# Patient Record
Sex: MALE | Race: OTHER | HISPANIC OR LATINO
[De-identification: names, ages, dates, MRNs, and addresses within clinical notes are randomized per-mention and may not be internally consistent; named-entity substitution may affect disease eponyms.]

---

## 2020-04-20 PROBLEM — Z00.00 ENCOUNTER FOR PREVENTIVE HEALTH EXAMINATION: Status: ACTIVE | Noted: 2020-04-20

## 2022-05-09 ENCOUNTER — APPOINTMENT (OUTPATIENT)
Dept: GASTROENTEROLOGY | Facility: CLINIC | Age: 57
End: 2022-05-09
Payer: COMMERCIAL

## 2022-05-09 VITALS
DIASTOLIC BLOOD PRESSURE: 70 MMHG | SYSTOLIC BLOOD PRESSURE: 138 MMHG | BODY MASS INDEX: 26.66 KG/M2 | WEIGHT: 160 LBS | TEMPERATURE: 97 F | HEIGHT: 65 IN | HEART RATE: 97 BPM

## 2022-05-09 DIAGNOSIS — Z78.9 OTHER SPECIFIED HEALTH STATUS: ICD-10-CM

## 2022-05-09 DIAGNOSIS — Z87.891 PERSONAL HISTORY OF NICOTINE DEPENDENCE: ICD-10-CM

## 2022-05-09 PROCEDURE — 99214 OFFICE O/P EST MOD 30 MIN: CPT

## 2022-05-09 RX ORDER — TAMSULOSIN HYDROCHLORIDE 0.4 MG/1
0.4 CAPSULE ORAL
Refills: 0 | Status: ACTIVE | COMMUNITY

## 2022-05-09 RX ORDER — ATORVASTATIN CALCIUM 20 MG/1
20 TABLET, FILM COATED ORAL
Refills: 0 | Status: ACTIVE | COMMUNITY

## 2022-05-09 NOTE — ASSESSMENT
[FreeTextEntry1] : In a patient whose last colonoscopy was in the year 2015 with Dr. Galan, who is now retiring, there is some change in bowel habits, some constipation, some right and left sided pain in the abdomen which is relieved with a good bowel evacuation, and a sense of an intermittently prolapsing lesion of the rectum which seems to be by history and by Dr. Galan's exam and a prolapsing hemorrhoid\par \par 2.  Long-term use of pantoprazole over 5 years, with his reflux symptoms mostly consisting of a knot-like sensation in the throat.\par \par We have talked about colonoscopy and EGD\par He will decrease his pantoprazole to 40 mg every other day for 2 weeks and then every third day for 2 weeks and then try to stop it\par \par I talked about reflux Gourmet and he will try that as well\par F \par More than 50% of the face to face time was devoted to counseling and /or coordination of care.  THis coordination of care may have included reviewing other medical notes and reports, and communicating with other health professionals\par \par AS WE OBTAIN INFORMED CONSENT FOR COLONOSCOPY, UPPER ENDOSCOPY [EGD], OR BOTH PROCEDURES TOGETHER;\par \par As with all procedures, there are risks of which the patient should be aware\par \par 1.  Anesthesia; deep sedation with Propofol;  there is a small risk of aspiration and pulmonary infection.  The Anesthesiologist meets with the patient the morning of the procedure to discuss in more detail\par \par 2.  risk of bleeding; from removal of a polyp, or rarely, from biopsies, 1 % or less\par \par 3.  risk of injury or perforation of the colon or upper GI tract; one in a thousand or less,  from removing a polyp or from advancing the instrument\par \par \par

## 2022-05-09 NOTE — PHYSICAL EXAM
[Bowel Sounds] : normal bowel sounds [Abdomen Soft] : soft [Abdomen Tenderness] : non-tender [] : no hepato-splenomegaly [Abdomen Mass (___ Cm)] : no abdominal mass palpated [No Rectal Mass] : no rectal mass [Internal Hemorrhoid] : no internal hemorrhoids [External Hemorrhoid] : no external hemorrhoids [FreeTextEntry1] : Did not know a prolapsing hemorrhoid even with pressure but the patient does say that it comes out when he moves his bowels

## 2022-05-09 NOTE — HISTORY OF PRESENT ILLNESS
[FreeTextEntry1] : In office visit first visit\par Chief complaint\par 1.  Change in bowel habits, some constipation, some right and left sided abdominal pain, and concern about a protuberance per rectum on an intermittent basis which seems to come out with his bowel movements.  It was suggested that this was a prolapsing hemorrhoid\par \par \par 2.  Symptoms suggesting esophageal reflux, with sensitivity to acidic foods and esophageal triggers such as tomato sauce, has been on pantoprazole for the last 5 or more years, although sometimes he is off or takes him self off the pantoprazole abruptly which may result in increased symptoms several weeks later\par 3.  He is otherwise in good medical condition.  He does not have any heart disease.  He does have hypertension and he is treated by Dr. Osbaldo Mixon, and she has been aiming to take him off the chlorthalidone and potassium chloride combination and replacing that with amlodipine so she is shifting him over.

## 2022-06-24 ENCOUNTER — RESULT REVIEW (OUTPATIENT)
Age: 57
End: 2022-06-24

## 2022-06-26 ENCOUNTER — RESULT REVIEW (OUTPATIENT)
Age: 57
End: 2022-06-26

## 2022-06-27 ENCOUNTER — TRANSCRIPTION ENCOUNTER (OUTPATIENT)
Age: 57
End: 2022-06-27

## 2022-06-27 ENCOUNTER — APPOINTMENT (OUTPATIENT)
Dept: GASTROENTEROLOGY | Facility: HOSPITAL | Age: 57
End: 2022-06-27

## 2022-06-29 ENCOUNTER — NON-APPOINTMENT (OUTPATIENT)
Age: 57
End: 2022-06-29

## 2022-09-20 ENCOUNTER — APPOINTMENT (OUTPATIENT)
Dept: GASTROENTEROLOGY | Facility: CLINIC | Age: 57
End: 2022-09-20

## 2022-09-20 VITALS
BODY MASS INDEX: 26.16 KG/M2 | HEART RATE: 65 BPM | SYSTOLIC BLOOD PRESSURE: 146 MMHG | DIASTOLIC BLOOD PRESSURE: 82 MMHG | TEMPERATURE: 97.2 F | WEIGHT: 157 LBS | HEIGHT: 65 IN

## 2022-09-20 PROCEDURE — 99213 OFFICE O/P EST LOW 20 MIN: CPT

## 2022-09-20 RX ORDER — AMLODIPINE BESYLATE 5 MG/1
5 TABLET ORAL
Refills: 0 | Status: ACTIVE | COMMUNITY

## 2022-09-20 NOTE — ASSESSMENT
[FreeTextEntry1] : 1.  I have explained that the patient had been taking pantoprazole, which has side effects when you are on it a long time, the worst being thinning of the bone, or bone fragility, due to osteoporosis\par 2.  the other meds in the pantoprazole class have the same potential side effect\par 3.  therefore, i am advising the patient to stay on the reflux gourmet, which is free of this type of side effects\par 4.  patient could also try mixing it up with gaviscon advance which tastes diferrent, is also purchased on line\par 5. as long as patient does not develop significant symptoms, i would prefer this method of treatment vs putting him back on a pantoprazole like drug\par 6.  he also could alternate reflux gourmet with gaviscon advance\par \par More than 50% of the face to face time was devoted to counseling and /or coordination of care.  THis coordination of care may have included reviewing other medical notes and reports, and communicating with other health professionals\par \par patient is asked to return in six months

## 2022-09-20 NOTE — HISTORY OF PRESENT ILLNESS
[FreeTextEntry1] : patient has returned to office for a fu visit\par he was scoped in June\par egd for reflux,\par had been on Pantoprazole\par but he didn’t stay on pantoprazole, and when he stopped the pantoprazole, sx would return in a few days or in a week\par \par he had a colonoscopy;  which was normal\par he had an egd;  which showed short segment of barretts, confirmed on biopsies\par \par he has switched from pantoprazole intermittently to reflux gourmet, which he takes with each meal, just one teaspoon before each meal\par and he has nearly 100 per cent response, and prevention of any heartburn\par \par he used to have a lot of heartburn at night

## 2023-03-07 ENCOUNTER — APPOINTMENT (OUTPATIENT)
Dept: GASTROENTEROLOGY | Facility: CLINIC | Age: 58
End: 2023-03-07
Payer: COMMERCIAL

## 2023-03-07 VITALS
DIASTOLIC BLOOD PRESSURE: 78 MMHG | OXYGEN SATURATION: 98 % | SYSTOLIC BLOOD PRESSURE: 141 MMHG | WEIGHT: 157 LBS | BODY MASS INDEX: 26.16 KG/M2 | HEIGHT: 65 IN | HEART RATE: 62 BPM

## 2023-03-07 DIAGNOSIS — R07.89 OTHER CHEST PAIN: ICD-10-CM

## 2023-03-07 DIAGNOSIS — R10.11 RIGHT UPPER QUADRANT PAIN: ICD-10-CM

## 2023-03-07 PROCEDURE — 99214 OFFICE O/P EST MOD 30 MIN: CPT

## 2023-03-07 NOTE — HISTORY OF PRESENT ILLNESS
[FreeTextEntry1] : patient has recently been to er at [facility], jan 24th\par with precordial pain \par lasted several days\par pressure like\par started two or three hours post prandially, while he was driving\par \par not clearly related to meals or esophageal irritants\par non exertional \par sometimes there is ruq pain, which is more frequent than the chest pain\par \par no sob, no nausea or vomiting\par

## 2023-03-07 NOTE — PHYSICAL EXAM
[Normal] : alert, normal voice/communication, healthy appearing, no acute distress [Abdomen Soft] : soft [RUQ] : RUQ [de-identified] : ruq tenderness

## 2023-03-07 NOTE — ASSESSMENT
[FreeTextEntry1] : 1.  patient with known Barretts esophagus, on egd in 2022\par short segment\par \par has been experiencing recurring ruq pain but also atypical chest pain, non exertional, sometimes like a pressure, sometime para sternal, not radiating down the arms, not clearly with sob, or diaphoresis

## 2023-03-14 ENCOUNTER — RESULT REVIEW (OUTPATIENT)
Age: 58
End: 2023-03-14

## 2023-03-19 ENCOUNTER — NON-APPOINTMENT (OUTPATIENT)
Age: 58
End: 2023-03-19

## 2023-05-18 ENCOUNTER — APPOINTMENT (OUTPATIENT)
Dept: GASTROENTEROLOGY | Facility: CLINIC | Age: 58
End: 2023-05-18
Payer: COMMERCIAL

## 2023-05-18 PROCEDURE — 99443: CPT

## 2023-05-18 NOTE — REASON FOR VISIT
[Follow-up] : a follow-up of an existing diagnosis [FreeTextEntry1] : follow up for pre existing condition

## 2023-05-18 NOTE — HISTORY OF PRESENT ILLNESS
[FreeTextEntry1] : this is  a telephon visit, audio only, patient is at home, I am in my house also, just patient and I, consent on file\par \par 1.  esophageal reflux, short segment joe's esophagus, severe reflux symptoms\par he has been on Omeprazole, 20 mgm per day, and the clinical control has been quite good, he feels a lot better, but still frequently does get some heartburn should he take a spicy food or ingest an esophageal irritant\par he does his best on diet, has given up coffee, for example\par he is thin, not heaby, so weight loss is not an option\par \par however, for David, this has really been good improvement, perhaps better than he used to experience with Pantoprazole

## 2023-05-18 NOTE — ASSESSMENT
[FreeTextEntry1] : 1.  doing well on omeprazole 20 mgm per day\par 2.  The omeprazole control seems to be superior to previous control when he was on pantoprazole.  This is somewhat surprising but occasionally 1 formulation works better than another for certain patient\par 3.  The goal with Linares's should be very good acid and symptom suppression.  Therefore, I tend to avoid every other day regimens, and if there is still considerable reflux symptomatology even with clinical improvement I favor upping the dose from omeprazole 20 to omeprazole 40 mg/day.\par 4.  Also I am recommending alginate therapy in the form of reflux Gourmet\par \par Plan: EGD in June 2025, follow-up clinically in 1 year or earlier should there be problems or failure of control, and I am sending the patient in full and a link for reflux Gourmet, and finally I asked him to try a wedge pillow, or a strategy of taking alginate routinely at bedtime to prevent any nocturnal symptoms when he eats too late

## 2024-03-12 ENCOUNTER — APPOINTMENT (OUTPATIENT)
Dept: GASTROENTEROLOGY | Facility: CLINIC | Age: 59
End: 2024-03-12
Payer: COMMERCIAL

## 2024-03-12 VITALS
OXYGEN SATURATION: 98 % | SYSTOLIC BLOOD PRESSURE: 140 MMHG | DIASTOLIC BLOOD PRESSURE: 90 MMHG | BODY MASS INDEX: 26.66 KG/M2 | WEIGHT: 160 LBS | HEART RATE: 93 BPM | HEIGHT: 65 IN | RESPIRATION RATE: 15 BRPM

## 2024-03-12 DIAGNOSIS — K22.70 BARRETT'S ESOPHAGUS W/OUT DYSPLASIA: ICD-10-CM

## 2024-03-12 DIAGNOSIS — K21.9 GASTRO-ESOPHAGEAL REFLUX DISEASE W/OUT ESOPHAGITIS: ICD-10-CM

## 2024-03-12 PROCEDURE — 99213 OFFICE O/P EST LOW 20 MIN: CPT

## 2024-03-12 RX ORDER — PANTOPRAZOLE 40 MG/1
40 TABLET, DELAYED RELEASE ORAL
Refills: 0 | Status: DISCONTINUED | COMMUNITY
End: 2024-03-12

## 2024-03-12 RX ORDER — CHLORTHALIDONE 25 MG/1
25 TABLET ORAL
Refills: 0 | Status: DISCONTINUED | COMMUNITY
End: 2024-03-12

## 2024-03-12 RX ORDER — FAMOTIDINE 40 MG/1
40 TABLET, FILM COATED ORAL
Refills: 0 | Status: ACTIVE | COMMUNITY

## 2024-03-12 RX ORDER — POTASSIUM CHLORIDE 750 MG/1
10 TABLET, EXTENDED RELEASE ORAL
Refills: 0 | Status: DISCONTINUED | COMMUNITY
End: 2024-03-12

## 2024-03-12 NOTE — HISTORY OF PRESENT ILLNESS
[FreeTextEntry1] : 1.  re patient s sx of gerd he was actually doing well and then perhaps over the last few weeks, while his retrosternal burning did not start, he began noticing a throat irritation and the need to clear his throat, so he put himself on Pepcid 20 mg twice a day in addition to his omeprazole.  With that combination the throat clearing seem to resolve pretty quickly He has been on a steady date dose of omeprazole 20 mg/day more or less continuously since the time of his EGD as it is proved more effective than the pantoprazole which she had previously taken. He has a short segment Linares's and he is aware of it so we would like to use as our goal the suppression of any symptoms of heartburn and reflux.

## 2024-03-12 NOTE — ASSESSMENT
[FreeTextEntry1] : 1.  patient is in good medical health, and his reflux is doing quite well. 2.  he did have a bit of a flareup of reflux symptoms, and these were mostly characterized by throat clearing rather than the more typical substernal burning which she had had previously. 3.  He put himself on famotidine or Pepcid 20 mg before breakfast and before dinner and he noticed that that helped pretty quickly 4. patient will go to just using famotidine at night time 5.  he has been on a long course of ppi therapy, and i am going to advise a.  he take a magnesium calcium tablet, as the ppis are occasionally assoc with low magnesium b.  he can take some vitamin d, like 1000 u of vitamin d3 per day c.  and this is important  when he next sees dr Adler, I am asking the patient to discuss having a bone density test as a baseline and perhaps a serum magnesium test with the next blood draw  cc:  Dr Adler, we will fax this report to Dr Adler.  fu office in one year, perhaps in the spring of 2025, and then egd approx june 2025

## 2024-04-21 ENCOUNTER — RX RENEWAL (OUTPATIENT)
Age: 59
End: 2024-04-21

## 2024-04-21 RX ORDER — OMEPRAZOLE 40 MG/1
40 CAPSULE, DELAYED RELEASE ORAL
Qty: 90 | Refills: 3 | Status: ACTIVE | COMMUNITY
Start: 2023-05-18 | End: 1900-01-01

## 2024-10-08 ENCOUNTER — APPOINTMENT (OUTPATIENT)
Dept: GASTROENTEROLOGY | Facility: CLINIC | Age: 59
End: 2024-10-08

## 2024-11-26 ENCOUNTER — APPOINTMENT (OUTPATIENT)
Dept: GASTROENTEROLOGY | Facility: CLINIC | Age: 59
End: 2024-11-26
Payer: COMMERCIAL

## 2024-11-26 VITALS
WEIGHT: 160 LBS | BODY MASS INDEX: 26.66 KG/M2 | DIASTOLIC BLOOD PRESSURE: 78 MMHG | HEART RATE: 74 BPM | OXYGEN SATURATION: 99 % | SYSTOLIC BLOOD PRESSURE: 136 MMHG | HEIGHT: 65 IN

## 2024-11-26 DIAGNOSIS — K21.9 GASTRO-ESOPHAGEAL REFLUX DISEASE W/OUT ESOPHAGITIS: ICD-10-CM

## 2024-11-26 DIAGNOSIS — K22.70 BARRETT'S ESOPHAGUS W/OUT DYSPLASIA: ICD-10-CM

## 2024-11-26 PROCEDURE — 99213 OFFICE O/P EST LOW 20 MIN: CPT

## 2025-01-24 ENCOUNTER — RESULT REVIEW (OUTPATIENT)
Age: 60
End: 2025-01-24

## 2025-01-24 ENCOUNTER — APPOINTMENT (OUTPATIENT)
Dept: GASTROENTEROLOGY | Facility: HOSPITAL | Age: 60
End: 2025-01-24

## 2025-01-24 ENCOUNTER — TRANSCRIPTION ENCOUNTER (OUTPATIENT)
Age: 60
End: 2025-01-24

## 2025-04-01 ENCOUNTER — APPOINTMENT (OUTPATIENT)
Dept: GASTROENTEROLOGY | Facility: CLINIC | Age: 60
End: 2025-04-01